# Patient Record
Sex: MALE | Race: BLACK OR AFRICAN AMERICAN | NOT HISPANIC OR LATINO | Employment: FULL TIME | URBAN - METROPOLITAN AREA
[De-identification: names, ages, dates, MRNs, and addresses within clinical notes are randomized per-mention and may not be internally consistent; named-entity substitution may affect disease eponyms.]

---

## 2023-01-07 ENCOUNTER — HOSPITAL ENCOUNTER (EMERGENCY)
Facility: HOSPITAL | Age: 23
Discharge: HOME | End: 2023-01-07
Attending: EMERGENCY MEDICINE | Admitting: EMERGENCY MEDICINE
Payer: COMMERCIAL

## 2023-01-07 VITALS
SYSTOLIC BLOOD PRESSURE: 133 MMHG | HEIGHT: 68 IN | BODY MASS INDEX: 27.43 KG/M2 | WEIGHT: 181 LBS | RESPIRATION RATE: 18 BRPM | OXYGEN SATURATION: 97 % | HEART RATE: 90 BPM | DIASTOLIC BLOOD PRESSURE: 111 MMHG

## 2023-01-07 DIAGNOSIS — R44.0 AUDITORY HALLUCINATION: Primary | ICD-10-CM

## 2023-01-07 LAB
ALBUMIN SERPL-MCNC: 4.7 G/DL (ref 3.5–5.2)
ALBUMIN/GLOB SERPL: 1.6 G/DL
ALP SERPL-CCNC: 107 U/L (ref 39–117)
ALT SERPL W P-5'-P-CCNC: 28 U/L (ref 1–41)
AMPHET+METHAMPHET UR QL: NEGATIVE
ANION GAP SERPL CALCULATED.3IONS-SCNC: 12.1 MMOL/L (ref 5–15)
APAP SERPL-MCNC: <5 MCG/ML (ref 0–30)
AST SERPL-CCNC: 24 U/L (ref 1–40)
BARBITURATES UR QL SCN: NEGATIVE
BASOPHILS # BLD AUTO: 0.03 10*3/MM3 (ref 0–0.2)
BASOPHILS NFR BLD AUTO: 0.3 % (ref 0–1.5)
BENZODIAZ UR QL SCN: NEGATIVE
BILIRUB SERPL-MCNC: 1 MG/DL (ref 0–1.2)
BUN SERPL-MCNC: 13 MG/DL (ref 6–20)
BUN/CREAT SERPL: 14 (ref 7–25)
CALCIUM SPEC-SCNC: 9.7 MG/DL (ref 8.6–10.5)
CANNABINOIDS SERPL QL: NEGATIVE
CHLORIDE SERPL-SCNC: 102 MMOL/L (ref 98–107)
CO2 SERPL-SCNC: 25.9 MMOL/L (ref 22–29)
COCAINE UR QL: NEGATIVE
CREAT SERPL-MCNC: 0.93 MG/DL (ref 0.76–1.27)
DEPRECATED RDW RBC AUTO: 35.4 FL (ref 37–54)
EGFRCR SERPLBLD CKD-EPI 2021: 118.3 ML/MIN/1.73
EOSINOPHIL # BLD AUTO: 0.06 10*3/MM3 (ref 0–0.4)
EOSINOPHIL NFR BLD AUTO: 0.6 % (ref 0.3–6.2)
ERYTHROCYTE [DISTWIDTH] IN BLOOD BY AUTOMATED COUNT: 12.8 % (ref 12.3–15.4)
ETHANOL BLD-MCNC: <10 MG/DL (ref 0–10)
ETHANOL UR QL: <0.01 %
GLOBULIN UR ELPH-MCNC: 3 GM/DL
GLUCOSE SERPL-MCNC: 100 MG/DL (ref 65–99)
HCT VFR BLD AUTO: 39.6 % (ref 37.5–51)
HGB BLD-MCNC: 13.7 G/DL (ref 13–17.7)
HOLD SPECIMEN: NORMAL
HOLD SPECIMEN: NORMAL
IMM GRANULOCYTES # BLD AUTO: 0.03 10*3/MM3 (ref 0–0.05)
IMM GRANULOCYTES NFR BLD AUTO: 0.3 % (ref 0–0.5)
LYMPHOCYTES # BLD AUTO: 2.53 10*3/MM3 (ref 0.7–3.1)
LYMPHOCYTES NFR BLD AUTO: 23.9 % (ref 19.6–45.3)
MCH RBC QN AUTO: 26.8 PG (ref 26.6–33)
MCHC RBC AUTO-ENTMCNC: 34.6 G/DL (ref 31.5–35.7)
MCV RBC AUTO: 77.3 FL (ref 79–97)
METHADONE UR QL SCN: NEGATIVE
MONOCYTES # BLD AUTO: 0.8 10*3/MM3 (ref 0.1–0.9)
MONOCYTES NFR BLD AUTO: 7.6 % (ref 5–12)
NEUTROPHILS NFR BLD AUTO: 67.3 % (ref 42.7–76)
NEUTROPHILS NFR BLD AUTO: 7.13 10*3/MM3 (ref 1.7–7)
NRBC BLD AUTO-RTO: 0 /100 WBC (ref 0–0.2)
OPIATES UR QL: NEGATIVE
OXYCODONE UR QL SCN: NEGATIVE
PLATELET # BLD AUTO: 254 10*3/MM3 (ref 140–450)
PMV BLD AUTO: 10.5 FL (ref 6–12)
POTASSIUM SERPL-SCNC: 3.7 MMOL/L (ref 3.5–5.2)
PROT SERPL-MCNC: 7.7 G/DL (ref 6–8.5)
RBC # BLD AUTO: 5.12 10*6/MM3 (ref 4.14–5.8)
SALICYLATES SERPL-MCNC: <0.3 MG/DL
SODIUM SERPL-SCNC: 140 MMOL/L (ref 136–145)
WBC NRBC COR # BLD: 10.58 10*3/MM3 (ref 3.4–10.8)
WHOLE BLOOD HOLD COAG: NORMAL
WHOLE BLOOD HOLD SPECIMEN: NORMAL

## 2023-01-07 PROCEDURE — 80307 DRUG TEST PRSMV CHEM ANLYZR: CPT

## 2023-01-07 PROCEDURE — 80179 DRUG ASSAY SALICYLATE: CPT

## 2023-01-07 PROCEDURE — 85025 COMPLETE CBC W/AUTO DIFF WBC: CPT

## 2023-01-07 PROCEDURE — 36415 COLL VENOUS BLD VENIPUNCTURE: CPT

## 2023-01-07 PROCEDURE — 80053 COMPREHEN METABOLIC PANEL: CPT

## 2023-01-07 PROCEDURE — 99283 EMERGENCY DEPT VISIT LOW MDM: CPT

## 2023-01-07 PROCEDURE — 80143 DRUG ASSAY ACETAMINOPHEN: CPT

## 2023-01-07 PROCEDURE — 82077 ASSAY SPEC XCP UR&BREATH IA: CPT

## 2023-01-07 RX ORDER — SODIUM CHLORIDE 0.9 % (FLUSH) 0.9 %
10 SYRINGE (ML) INJECTION AS NEEDED
Status: DISCONTINUED | OUTPATIENT
Start: 2023-01-07 | End: 2023-01-07 | Stop reason: HOSPADM

## 2023-01-07 NOTE — Clinical Note
[facility] EMERGENCY ROOM  [address]  [place] [address]  Phone: [phone]    Ivan Bradshaw was seen and treated in our emergency department on 1/7/2023.  He may return to work on 01/09/2023.         Thank you for choosing [facility].    Jade Guardado, APRN

## 2023-01-07 NOTE — SIGNIFICANT NOTE
01/07/23 1722   Behavior WDL   Behavior WDL WDL;interactions;motor movement   Interactions eye contact appropriate;cooperative   Motor Movement no unusual gestures/mannerisms   Emotion Mood WDL   Emotion/Mood/Affect WDL X;affect;emotion mood   Affect flat   Emotion/Mood calm   Speech WDL   Speech WDL WDL   Perceptual State WDL   Perceptual State WDL WDL;hallucinations;perceptual state   Hallucinations denies hallucinations;other (see comments)  (reports hallucinations when angry, however, did not expand other than stating he hears voices to do things)   Perceptual State consistent with reality   Thought Process WDL   Thought Process WDL WDL   Intellectual Performance WDL   Intellectual Performance WDL WDL;intellectual performance;level of consciousness   Intellectual Performance able to comprehend   Level of Consciousness Alert   Coping/Stress   Major Change/Loss/Stressor residence change   Patient Personal Strengths self-reliant;strong support system   Sources of Support significant other   Techniques to Man with Loss/Stress/Change none   Reaction to Health Status adjusting   C-SSRS (Recent)   Q1 Wished to be Dead (Past Month) no   Q2 Suicidal Thoughts (Past Month) no   Q6 Suicide Behavior (Lifetime) no   Violence Risk   Feels Like Hurting Others no   Previous Attempt to Harm Others no       SW met with pt at bedside at the request of the provider. Pt reports that he is here for medication management. Pt denied any current medications and denied any prior psychiatry or outpatient therapy. Pt reports that he has hallucinations sometimes when he is angry and these voices tell him to do things he would not normally do, however, pt was guarded and would not expand on what those things may be throughout the assessment. Pt denied current SI/HI this date. Pt reports that he resides with his girlfriend and that is his support system. Pt and SW discussed outpatient referral this date, pt was agreeable. SW made referral  to [facility] for [facility]ry. SW also provided pt with community resource packet. SW updated provider.

## 2023-01-07 NOTE — ED PROVIDER NOTES
Time: 3:23 PM EST  Date of encounter:  1/7/2023  Independent Historian/Clinical History and Information was obtained by:   Patient  Chief Complaint   Patient presents with   • Psychiatric Evaluation       History is limited by: N/A    History of Present Illness:  Patient is a 22 y.o. year old male who presents to the emergency department for evaluation of auditory hallucinations.  He says he has bipolar disorder that is not treated with any medications and he has been trying to get in with someone but he cannot.  He says he has people in his head telling him stuff he does not normally feel.  He is not suicidal or homicidal.  He also reports mood swings and anger that he cannot control.    HPI    Patient Care Team  Primary Care Provider: Provider, No Known    Past Medical History:     No Known Allergies  Past Medical History:   Diagnosis Date   • Autism      History reviewed. No pertinent surgical history.  History reviewed. No pertinent family history.    Home Medications:  Prior to Admission medications    Medication Sig Start Date End Date Taking? Authorizing Provider   ibuprofen (ADVIL,MOTRIN) 800 MG tablet Take 1 tablet by mouth Every 8 (Eight) Hours As Needed for Mild Pain  or Moderate Pain . 3/22/22   Enedina Arenas APRN        Social History:   Social History     Tobacco Use   • Smoking status: Never   • Smokeless tobacco: Never   Substance Use Topics   • Alcohol use: Never   • Drug use: Never         Review of Systems:  Review of Systems   Constitutional: Negative for activity change, appetite change and fever.   HENT: Negative.    Eyes: Negative.    Respiratory: Negative for shortness of breath.    Cardiovascular: Negative.    Gastrointestinal: Negative for nausea and vomiting.   Endocrine: Negative.    Genitourinary: Negative.    Musculoskeletal: Negative.    Skin: Negative.    Allergic/Immunologic: Negative.    Neurological: Negative for dizziness and headaches.   Psychiatric/Behavioral: Positive  "for hallucinations. Negative for agitation, confusion, self-injury and suicidal ideas. The patient is not nervous/anxious.         Physical Exam:  BP (!) 133/111   Pulse 90   Resp 18   Ht 172.7 cm (68\")   Wt 82.1 kg (181 lb)   SpO2 97%   BMI 27.52 kg/m²     Physical Exam  Vitals and nursing note reviewed.   Constitutional:       General: He is not in acute distress.     Appearance: Normal appearance. He is not ill-appearing.   HENT:      Head: Normocephalic and atraumatic.      Nose: Nose normal.   Eyes:      Extraocular Movements: Extraocular movements intact.      Pupils: Pupils are equal, round, and reactive to light.   Cardiovascular:      Rate and Rhythm: Normal rate and regular rhythm.      Pulses: Normal pulses.      Heart sounds: Normal heart sounds. No murmur heard.    No gallop.   Pulmonary:      Effort: Pulmonary effort is normal. No respiratory distress.      Breath sounds: Normal breath sounds. No wheezing, rhonchi or rales.   Chest:      Chest wall: No tenderness.   Abdominal:      General: Bowel sounds are normal. There is no distension.      Palpations: Abdomen is soft.      Tenderness: There is no abdominal tenderness.   Musculoskeletal:         General: No tenderness. Normal range of motion.      Cervical back: Normal range of motion and neck supple.   Skin:     General: Skin is warm and dry.      Findings: No erythema or rash.   Neurological:      General: No focal deficit present.      Mental Status: He is alert and oriented to person, place, and time.      Motor: No weakness.   Psychiatric:         Attention and Perception: Attention normal.         Mood and Affect: Mood and affect normal.         Speech: Speech normal.         Behavior: Behavior normal. Behavior is not agitated or aggressive. Behavior is cooperative.         Thought Content: Thought content normal. Thought content is not delusional. Thought content does not include homicidal or suicidal ideation. Thought content does not " include homicidal or suicidal plan.         Judgment: Judgment normal.                  Procedures:  Procedures      Medical Decision Making:      Comorbidities that affect care:    None    External Notes reviewed:    Previous Clinic Note      The following orders were placed and all results were independently analyzed by me:  Orders Placed This Encounter   Procedures   • [place] Draw   • Comprehensive Metabolic Panel   • Acetaminophen Level   • Ethanol   • Salicylate Level   • Urine Drug Screen - Urine, Clean Catch   • CBC Auto Differential   • NPO Diet NPO Type: Strict NPO   • Cardiac Monitoring   • Continuous Pulse Oximetry   • Vital Signs   • Undress & Gown   • Psych / Access to See   • POC Glucose Once   • Insert Peripheral IV   • Suicide Precautions   • CBC & Differential   • Green Top (Gel)   • Lavender Top   • Gold Top - SST   • Light Blue Top       Medications Given in the Emergency Department:  Medications   sodium chloride 0.9 % flush 10 mL (has no administration in time range)        ED Course:    The patient was initially evaluated in the triage area where orders were placed. The patient was later dispositioned by KAVIN King.      The patient was advised to stay for completion of workup which includes but is not limited to communication of labs and radiological results, reassessment and plan. The patient was advised that leaving prior to disposition by a provider could result in critical findings that are not communicated to the patient.          Labs:    Lab Results (last 24 hours)     Procedure Component Value Units Date/Time    CBC & Differential [[id number]]  (Abnormal) Collected: 01/07/23 1526    Specimen: Blood Updated: 01/07/23 1536    Narrative:      The following orders were created for panel order CBC & Differential.  Procedure                               Abnormality         Status                     ---------                               -----------         ------                      CBC Auto Differential[[id number]]        Abnormal            Final result                 Please view results for these tests on the individual orders.    Comprehensive Metabolic Panel [[id number]]  (Abnormal) Collected: 01/07/23 1526    Specimen: Blood Updated: 01/07/23 1605     Glucose 100 mg/dL      BUN 13 mg/dL      Creatinine 0.93 mg/dL      Sodium 140 mmol/L      Potassium 3.7 mmol/L      Chloride 102 mmol/L      CO2 25.9 mmol/L      Calcium 9.7 mg/dL      Total Protein 7.7 g/dL      Albumin 4.7 g/dL      ALT (SGPT) 28 U/L      AST (SGOT) 24 U/L      Alkaline Phosphatase 107 U/L      Total Bilirubin 1.0 mg/dL      Globulin 3.0 gm/dL      A/G Ratio 1.6 g/dL      BUN/Creatinine Ratio 14.0     Anion Gap 12.1 mmol/L      eGFR 118.3 mL/min/1.73      Comment: National Kidney Foundation and American Society of Nephrology (ASN) Task Force recommended calculation based on the Chronic Kidney Disease Epidemiology Collaboration (CKD-EPI) equation refit without adjustment for race.       Narrative:      GFR Normal >60  Chronic Kidney Disease <60  Kidney Failure <15      Acetaminophen Level [[id number]]  (Normal) Collected: 01/07/23 1526    Specimen: Blood Updated: 01/07/23 1605     Acetaminophen <5.0 mcg/mL     Ethanol [[id number]] Collected: 01/07/23 1526    Specimen: Blood Updated: 01/07/23 1605     Ethanol <10 mg/dL      Ethanol % <0.010 %     Narrative:      Ethanol (Plasma)  <10 Essentially Negative    Toxic Concentrations           mg/dL    Flushing, slowing of reflexes    Impaired visual activity         Depression of CNS              >100  Possible Coma                  >300       Salicylate Level [[id number]]  (Normal) Collected: 01/07/23 1526    Specimen: Blood Updated: 01/07/23 1605     Salicylate <0.3 mg/dL     CBC Auto Differential [[id number]]  (Abnormal) Collected: 01/07/23 1526    Specimen: Blood Updated: 01/07/23 1536     WBC 10.58 10*3/mm3      RBC 5.12 10*6/mm3      Hemoglobin 13.7 g/dL       Hematocrit 39.6 %      MCV 77.3 fL      MCH 26.8 pg      MCHC 34.6 g/dL      RDW 12.8 %      RDW-SD 35.4 fl      MPV 10.5 fL      Platelets 254 10*3/mm3      Neutrophil % 67.3 %      Lymphocyte % 23.9 %      Monocyte % 7.6 %      Eosinophil % 0.6 %      Basophil % 0.3 %      Immature Grans % 0.3 %      Neutrophils, Absolute 7.13 10*3/mm3      Lymphocytes, Absolute 2.53 10*3/mm3      Monocytes, Absolute 0.80 10*3/mm3      Eosinophils, Absolute 0.06 10*3/mm3      Basophils, Absolute 0.03 10*3/mm3      Immature Grans, Absolute 0.03 10*3/mm3      nRBC 0.0 /100 WBC     Urine Drug Screen - Urine, Clean Catch [[id number]]  (Normal) Collected: 01/07/23 1532    Specimen: Urine, Clean Catch Updated: 01/07/23 1619     Amphet/Methamphet, Screen Negative     Barbiturates Screen, Urine Negative     Benzodiazepine Screen, Urine Negative     Cocaine Screen, Urine Negative     Opiate Screen Negative     THC, Screen, Urine Negative     Methadone Screen, Urine Negative     Oxycodone Screen, Urine Negative    Narrative:      Negative Thresholds Per Drugs Screened:    Amphetamines                 500 ng/ml  Barbiturates                 200 ng/ml  Benzodiazepines              100 ng/ml  Cocaine                      300 ng/ml  Methadone                    300 ng/ml  Opiates                      300 ng/ml  Oxycodone                    100 ng/ml  THC                           50 ng/ml    The Normal Value for all drugs tested is negative. This report includes final unconfirmed screening results to be used for medical treatment purposes only. Unconfirmed results must not be used for non-medical purposes such as employment or legal testing. Clinical consideration should be applied to any drug of abuse test, particularly when unconfirmed results are used.                   Imaging:    No Radiology Exams Resulted Within Past 24 Hours      Differential Diagnosis and Discussion:      Differential diagnosis includes but not limited to suicidal  ideation, homicidal ideation, auditory hallucination, bipolar.    All labs were reviewed and analyzed by me.    MDM  Number of Diagnoses or Management Options  Diagnosis management comments: Patient presents today with complaints of auditory hallucinations intermittently when he has episodes of anger at home.  Patient reports he is feeling safe at home.  Denies any visual hallucinations.  He tells me that he hears voices only when he gets angry to tell him to do bad things to other people into himself.  He denies any active episodes of acting out on the hallucinations he reports.  No suicidal or homicidal ideations at this time.  He is alert and oriented x3.  He is cooperative.  He is able to make his own decisions at this time.  Consulted with social work here in the emergency department who has patient scheduled as outpatient to follow-up with behavioral health.  The patient was provided with resources upon discharge.  He is agreeable to this plan of care.       Amount and/or Complexity of Data Reviewed  Clinical lab tests: reviewed and ordered  Review and summarize past medical records: yes    Risk of Complications, Morbidity, and/or Mortality  Presenting problems: high  Diagnostic procedures: moderate  Management options: moderate    Patient Progress  Patient progress: stable           Patient Care Considerations:    None      Consultants/Shared Management Plan:    Consultant: I have discussed the case with Emergency department  who states Patient can be discharged home safely to follow-up as outpatient with psychiatry.  Resources provided    Social Determinants of Health:     was consulted and Patient was provided with resources for outpatient follow-up with psychiatry and behavioral health      Disposition and Care Coordination:    Discharged: The patient is suitable and stable for discharge with no need for consideration of observation or admission.    I have explained discharge  medications and the need for follow up with the patient/caretakers. This was also printed in the discharge instructions. Patient was discharged with the following medications and follow up:      Medication List      No changes were made to your prescriptions during this visit.      Provider, No Known  [facility] [id number]    Schedule an appointment as soon as possible for a visit       Behavioral health  Please keep your appointment with behavioral health as we provided you with the resources for follow-up.        [facility] EMERGENCY ROOM  [address] [address] [phone]  Go to   As needed, If symptoms worsen       Final diagnoses:   Auditory hallucination        ED Disposition     ED Disposition   Discharge    Condition   Stable    Comment   --             This medical record created using voice recognition software.           Jade Guardado, APRN  01/07/23 3830

## 2023-01-07 NOTE — DISCHARGE INSTRUCTIONS
All of your labs today were normal.  Please make sure that you follow-up with behavioral health from the resources that we provided you.  Return to the emergency department with any new or worsening symptoms such as any suicidal or homicidal thoughts or behaviors.

## 2023-01-24 ENCOUNTER — OFFICE VISIT (OUTPATIENT)
Dept: PSYCHIATRY | Facility: CLINIC | Age: 23
End: 2023-01-24
Payer: COMMERCIAL

## 2023-01-24 VITALS
HEART RATE: 97 BPM | HEIGHT: 68 IN | BODY MASS INDEX: 27.34 KG/M2 | WEIGHT: 180.4 LBS | SYSTOLIC BLOOD PRESSURE: 170 MMHG | DIASTOLIC BLOOD PRESSURE: 85 MMHG

## 2023-01-24 DIAGNOSIS — F51.05 INSOMNIA DUE TO MENTAL DISORDER: ICD-10-CM

## 2023-01-24 DIAGNOSIS — F31.5 BIPOLAR DISORDER, CURRENT EPISODE DEPRESSED, SEVERE, WITH PSYCHOTIC FEATURES: Primary | ICD-10-CM

## 2023-01-24 DIAGNOSIS — F41.1 GENERALIZED ANXIETY DISORDER: ICD-10-CM

## 2023-01-24 PROCEDURE — 90792 PSYCH DIAG EVAL W/MED SRVCS: CPT | Performed by: NURSE PRACTITIONER

## 2023-01-24 RX ORDER — ARIPIPRAZOLE 5 MG/1
5 TABLET ORAL DAILY
Qty: 30 TABLET | Refills: 0 | Status: SHIPPED | OUTPATIENT
Start: 2023-01-24 | End: 2023-02-16 | Stop reason: SDUPTHER

## 2023-01-24 NOTE — TREATMENT PLAN
Multi-Disciplinary Problems (from Behavioral Health Treatment Plan)    Active Problems     Problem: Anxiety  Start Date: 01/24/23    Problem Details: The patient self-scales this problem as a 5 with 10 being the worst.        Goal Priority Start Date Expected End Date End Date    Patient will develop and implement behavioral and cognitive strategies to reduce anxiety and irrational fears. -- 01/24/23 -- --    Goal Details: Progress toward goal:  Not appropriate to rate progress toward goal since this is the initial treatment plan.        Goal Intervention Frequency Start Date End Date    Help patient explore past emotional issues in relation to present anxiety. Weekly 01/24/23 --    Intervention Details: Duration of treatment until until remission of symptoms.        Goal Intervention Frequency Start Date End Date    Help patient develop an awareness of their cognitive and physical responses to anxiety. Weekly 01/24/23 --    Intervention Details: Duration of treatment until until remission of symptoms.              Problem: Mood Disorder  Start Date: 01/24/23    Problem Details: The patient self-scales this problem as a 5 with 10 being the worst.      Goal Priority Start Date Expected End Date End Date    Decrease impulsivity in action- that is, not engaging in self-destructive behavious such as overspending, promiscuity, substance abuse, or use of profane language. -- 01/24/23 -- --    Goal Details: Progress toward goal:  Not appropriate to rate progress toward goal since this is the initial treatment plan.      Goal Intervention Frequency Start Date End Date    Provide structure and focus to patients thoughts and action by establishing plans and routine. Weekly 01/24/23 --    Intervention Details: Duration of treatment until until remission of symptoms.      Goal Intervention Frequency Start Date End Date    Assist patient in setting reasonable goals and limits on behavior. Weekly 01/24/23 --    Intervention  Details: Duration of treatment until until remission of symptoms.                  Reviewed By     Juan Carlos Patel, KAVIN 01/24/23 1528                 I have discussed and reviewed this treatment plan with the patient.

## 2023-01-24 NOTE — PROGRESS NOTES
Subjective   Ivan Bradshaw is a 22 y.o. male who presents today for initial evaluation   This provider is located at [address] [place] [address], [address] in [place], [place]. In-person visit consisting of the patient and I only. The patient is accepting of and agreeable to this appointment.       Chief Complaint:  Depression, Anxiety, Psychosis    History of Present Illness: Depression: onset approximately 5 years ago, no specific triggers  Depressed mood: y  Markedly diminished interest or pleasure: y  Significant weight loss when not dieting or weight gain, or decrease or increase in appetite: n  Insomnia or hypersomnia: y- insomnia  Psychomotor agitation or retardation: n  Fatigue or loss of energy: y  Feelings of worthlessness or excessive or inappropriate guilt: y  Diminished ability to think or concentrate, or indecisiveness: y  Recurrent thoughts of death, recurrent suicidal ideation without a specific plan, or suicide attempt or specific plan for committing suicide- denies    Anxiety: Onset 5 years ago (see above)  Anxious, nervous or worried on most days about a number of events or activities- y  No control over worries- y- working on positive coping skills  Irritability- y  Fatigue: see above  Difficulty concentrating- see above  Sleep disturbance- see above  Restlessness- y  Tension in muscles-y    Shellie/hypomania: Onset 5 years ago  Grandiosity- denies  Decreased need for sleep- denies  More talkative than usual or pressure to keep talking- denies  Flight of ideas or subjective experience that thoughts are racing- denies  Distractibility- y  Increase in goal directed activity or psychomotor agitation- denies  Excessive involvement in activities that have a high potential for painful consequences- denies    Psychosis: began hearing voices two weeks ago  Delusions- denies  Hallucinations- endorses auditory hallucinations, stating he hears negative voices  Disorganized speech- n  Grossly disorganized or  catatonic behavior- denies  Negative symptoms- denies    Psychiatric Review of Systems: Patient denies any current or previous PTSD.     PHQ-9 Depression Screening  PHQ-9 Total Score:      Little interest or pleasure in doing things?     Feeling down, depressed, or hopeless?     Trouble falling or staying asleep, or sleeping too much?     Feeling tired or having little energy?     Poor appetite or overeating?     Feeling bad about yourself - or that you are a failure or have let yourself or your family down?     Trouble concentrating on things, such as reading the newspaper or watching television?     Moving or speaking so slowly that other people could have noticed? Or the opposite - being so fidgety or restless that you have been moving around a lot more than usual?     Thoughts that you would be better off dead, or of hurting yourself in some way?     PHQ-9 Total Score       ATUL-7  Feeling nervous, anxious or on edge: Several days  Not being able to stop or control worrying: Not at all  Worrying too much about different things: Not at all  Trouble Relaxing: Several days  Being so restless that it is hard to sit still: Not at all  Feeling afraid as if something awful might happen: Not at all  Becoming easily annoyed or irritable: Not at all  ATUL 7 Total Score: 2  If you checked any problems, how difficult have these problems made it for you to do your work, take care of things at home, or get along with other people: Not difficult at all      Past Surgical History:  History reviewed. No pertinent surgical history.    Problem List:  There is no problem list on file for this patient.      Allergy:   No Known Allergies     Discontinued Medications:  Medications Discontinued During This Encounter   Medication Reason   • ibuprofen (ADVIL,MOTRIN) 800 MG tablet Historical Med - Therapy completed       Current Medications:   Current Outpatient Medications   Medication Sig Dispense Refill   • ARIPiprazole (ABILIFY) 5 MG  "tablet Take 1 tablet by mouth Daily for 30 days. 30 tablet 0     No current facility-administered medications for this visit.       Past Medical History:  Past Medical History:   Diagnosis Date   • Autism        Past Psychiatric History:  Began Treatment: 2017  Diagnoses: Bipolar Disorder, anxiety  Psychiatrist: Nithin  Therapist: Denies  Admission History: Denies  Medication Trials: Denies  Self Harm: Denies  Suicide Attempts: Denies    Substance Abuse History:   Types: Denies  Withdrawal Symptoms: Not applicable  Longest Period Sober: Not applicable  AA: Not applicable    Social History:  Martial Status: Single  Employed: [employer]  Kids: One   House: Self   History: Denies    Social History     Socioeconomic History   • Marital status: Single   Tobacco Use   • Smoking status: Never   • Smokeless tobacco: Never   Substance and Sexual Activity   • Alcohol use: Never   • Drug use: Never   • Sexual activity: Defer       Family History:   Suicide Attempts: Denies  Suicide Completions: Denies      History reviewed. No pertinent family history.    Developmental History:   Born: [place]  Siblings: Multiple  Childhood: Denies  High School: Dropped out 12th grade  College: Denies    Access to Firearms: Denies    Mental Status Exam:     Appearance: good eye contact, normal street clothes, groomed, sitting in chair   Behavior: pleasant and cooperative  Motor: no abnormal  Speech: normal rhythm, rate, volume, tone, not hyperverbal, not pressured, normal prosidy  Mood: \" Okay \"  Affect: depressed  Thought Content: negative suicidal ideations, negative homicidal ideations, negative obsessions  Perceptions: negative auditory hallucinations, negative visual hallucinations, negative delusions, negative paranoia  Thought Process: goal directed, linear  Insight/Judgement: fair/fair  Cognition: grossly intact  Attention: intact  Orientation: person, place, time and situation  Memory: intact    Review of Systems:     Constitutional: " "Denies fatigue, night sweats  Eyes: Denies double vision, blurred vision  HENT: Denies vertigo, recent head injury  Cardiovascular: Denies chest pain, irregular heartbeats  Respiratory: Denies productive cough, shortness of breath  Gastrointestinal: Denies nausea, vomiting  Genitourinary: Denies dysuria, urinary retention  Integument: Denies hair growth change, new skin lesions  Neurologic: Denies altered mental status, seizures  Musculoskeletal: Denies joint swelling, limitation of motion  Endocrine: Denies cold intolerance, heat intolerance  Psychiatric: Admits anxiety, depression, psychosis. Denies soo, post-traumatic stress disorder, obsessive compulsive disorder  Allergic-immunologic: Denies frequent illnesses      Vital Signs:   /85   Pulse 97   Ht 172.7 cm (68\")   Wt 81.8 kg (180 lb 6.4 oz)   BMI 27.43 kg/m²      Lab Results:   Admission on 01/07/2023, Discharged on 01/07/2023   Component Date Value Ref Range Status   • Glucose 01/07/2023 100 (H)  65 - 99 mg/dL Final   • BUN 01/07/2023 13  6 - 20 mg/dL Final   • Creatinine 01/07/2023 0.93  0.76 - 1.27 mg/dL Final   • Sodium 01/07/2023 140  136 - 145 mmol/L Final   • Potassium 01/07/2023 3.7  3.5 - 5.2 mmol/L Final   • Chloride 01/07/2023 102  98 - 107 mmol/L Final   • CO2 01/07/2023 25.9  22.0 - 29.0 mmol/L Final   • Calcium 01/07/2023 9.7  8.6 - 10.5 mg/dL Final   • Total Protein 01/07/2023 7.7  6.0 - 8.5 g/dL Final   • Albumin 01/07/2023 4.7  3.5 - 5.2 g/dL Final   • ALT (SGPT) 01/07/2023 28  1 - 41 U/L Final   • AST (SGOT) 01/07/2023 24  1 - 40 U/L Final   • Alkaline Phosphatase 01/07/2023 107  39 - 117 U/L Final   • Total Bilirubin 01/07/2023 1.0  0.0 - 1.2 mg/dL Final   • Globulin 01/07/2023 3.0  gm/dL Final   • A/G Ratio 01/07/2023 1.6  g/dL Final   • BUN/Creatinine Ratio 01/07/2023 14.0  7.0 - 25.0 Final   • Anion Gap 01/07/2023 12.1  5.0 - 15.0 mmol/L Final   • eGFR 01/07/2023 118.3  >60.0 mL/min/1.73 Final    National Kidney Foundation " and American Society of Nephrology (ASN) Task Force recommended calculation based on the Chronic Kidney Disease Epidemiology Collaboration (CKD-EPI) equation refit without adjustment for race.   • Acetaminophen 01/07/2023 <5.0  0.0 - 30.0 mcg/mL Final   • Ethanol 01/07/2023 <10  0 - 10 mg/dL Final   • Ethanol % 01/07/2023 <0.010  % Final   • Salicylate 01/07/2023 <0.3  <=30.0 mg/dL Final   • Amphet/Methamphet, Screen 01/07/2023 Negative  Negative Final   • Barbiturates Screen, Urine 01/07/2023 Negative  Negative Final   • Benzodiazepine Screen, Urine 01/07/2023 Negative  Negative Final   • Cocaine Screen, Urine 01/07/2023 Negative  Negative Final   • Opiate Screen 01/07/2023 Negative  Negative Final   • THC, Screen, Urine 01/07/2023 Negative  Negative Final   • Methadone Screen, Urine 01/07/2023 Negative  Negative Final   • Oxycodone Screen, Urine 01/07/2023 Negative  Negative Final   • Extra Tube 01/07/2023 Hold for add-ons.   Final    Auto resulted.   • Extra Tube 01/07/2023 hold for add-on   Final    Auto resulted   • Extra Tube 01/07/2023 Hold for add-ons.   Final    Auto resulted.   • Extra Tube 01/07/2023 Hold for add-ons.   Final    Auto resulted   • WBC 01/07/2023 10.58  3.40 - 10.80 10*3/mm3 Final   • RBC 01/07/2023 5.12  4.14 - 5.80 10*6/mm3 Final   • Hemoglobin 01/07/2023 13.7  13.0 - 17.7 g/dL Final   • Hematocrit 01/07/2023 39.6  37.5 - 51.0 % Final   • MCV 01/07/2023 77.3 (L)  79.0 - 97.0 fL Final   • MCH 01/07/2023 26.8  26.6 - 33.0 pg Final   • MCHC 01/07/2023 34.6  31.5 - 35.7 g/dL Final   • RDW 01/07/2023 12.8  12.3 - 15.4 % Final   • RDW-SD 01/07/2023 35.4 (L)  37.0 - 54.0 fl Final   • MPV 01/07/2023 10.5  6.0 - 12.0 fL Final   • Platelets 01/07/2023 254  140 - 450 10*3/mm3 Final   • Neutrophil % 01/07/2023 67.3  42.7 - 76.0 % Final   • Lymphocyte % 01/07/2023 23.9  19.6 - 45.3 % Final   • Monocyte % 01/07/2023 7.6  5.0 - 12.0 % Final   • Eosinophil % 01/07/2023 0.6  0.3 - 6.2 % Final   • Basophil  % 01/07/2023 0.3  0.0 - 1.5 % Final   • Immature Grans % 01/07/2023 0.3  0.0 - 0.5 % Final   • Neutrophils, Absolute 01/07/2023 7.13 (H)  1.70 - 7.00 10*3/mm3 Final   • Lymphocytes, Absolute 01/07/2023 2.53  0.70 - 3.10 10*3/mm3 Final   • Monocytes, Absolute 01/07/2023 0.80  0.10 - 0.90 10*3/mm3 Final   • Eosinophils, Absolute 01/07/2023 0.06  0.00 - 0.40 10*3/mm3 Final   • Basophils, Absolute 01/07/2023 0.03  0.00 - 0.20 10*3/mm3 Final   • Immature Grans, Absolute 01/07/2023 0.03  0.00 - 0.05 10*3/mm3 Final   • nRBC 01/07/2023 0.0  0.0 - 0.2 /100 WBC Final   Admission on 02/14/2022, Discharged on 02/14/2022   Component Date Value Ref Range Status   • SARS Antigen 02/14/2022 Not Detected  Not Detected Final   • Internal Control 02/14/2022 Passed  Passed Final   • Lot Number 02/14/2022 [id number]   Final   • Expiration Date 02/14/2022 [id number]   Final       EKG Results:  No orders to display       Imaging Results:  No Images in the past 120 days found..      Assessment & Plan   Diagnoses and all orders for this visit:    1. Bipolar disorder, current episode depressed, severe, with psychotic features (HCC) (Primary)  -     ARIPiprazole (ABILIFY) 5 MG tablet; Take 1 tablet by mouth Daily for 30 days.  Dispense: 30 tablet; Refill: 0    2. Generalized anxiety disorder    3. Insomnia due to mental disorder      Presents with history of bipolar disorder and anxiety. Start aripiprazole to target mood and psychosis. 16 minutes of supportive psychotherapy with goal to strengthen defenses, promote problems solving, restore adaptive functioning and provide symptom relief. The therapeutic alliance was strengthened to encourage the patient to express their thoughts and feelings. Esteem building was enhanced through praise, reassurance, normalizing and encouragement. Coping skills were enhanced to build distress tolerance skills and emotional regulation. Allowed patient to freely discuss issues without interruption or judgement with  unconditional positive regard, active listening skills, and empathy. Provided a safe, confidential environment to facilitate the development of a positive therapeutic relationship and encourage open, honest communication. Assisted patient in identifying risk factors which would indicate the need for higher level of care including thoughts to harm self or others and/or self-harming behavior and encouraged patient to contact this office, call 911, or present to the nearest emergency room should any of these events occur. Assisted patient in processing session content; acknowledged and normalized patient's thoughts, feelings, and concerns by utilizing a person-centered approach in efforts to build appropriate rapport and a positive therapeutic relationship with open and honest communication. Patient given education on medication side effects, diagnosis/illness and relapse symptoms. Plan to continue supportive psychotherapy in next appointment to provide symptom relief. 2 weeks    Diagnoses: as above  Symptoms: as above  Functional status: good  Mental Status Exam: as above     Treatment plan: medication management and supportive psychotherapy  Prognosis: good  Progress: initial visit    Visit Diagnoses:    ICD-10-CM ICD-9-CM   1. Bipolar disorder, current episode depressed, severe, with psychotic features ([facility])  F31.5 296.54   2. Generalized anxiety disorder  F41.1 300.02   3. Insomnia due to mental disorder  F51.05 300.9     327.02       PLAN:  1. Safety: No acute safety concerns.   2. Therapy: Declines  3. Risk Assessment: Risk of self-harm acutely is moderate.  Risk factors include anxiety disorder, mood disorder, and recent psychosocial stressors (pandemic). Protective factors include no family history, denies access to guns/weapons, no present SI, no history of suicide attempts or self-harm in the past, minimal AODA, healthcare seeking, future orientation, willingness to engage in care.  Risk of self-harm  chronically is also moderate, but could be further elevated in the event of treatment noncompliance and/or AODA.  4. Medications: Start aripiprazole 5mg po qday to target mood. Risks, benefits, alternatives discussed with patient including increased energy, exacerbation of irritability, akathisia, GI upset, orthostatic hypotension, increased appetite, tardive dyskinesia.  After discussion of these risks and benefits, the patient voiced understanding and agreed to proceed.  5. Labs/studies: No labs/studies ordered at this time  6. Follow-up: 2 weeks    Patient screened positive for depression based on a PHQ-9 score of  on . Follow-up recommendations include: Suicide Risk Assessment performed.         TREATMENT PLAN/GOALS: Continue supportive psychotherapy efforts and medications as indicated. Treatment and medication options discussed during today's visit. Patient ackowledged and verbally consented to continue with current treatment plan and was educated on the importance of compliance with treatment and follow-up appointments.      MEDICATION ISSUES:  ZORA reviewed as expected.  Discussed medication options and treatment plan of prescribed medication as well as the risks, benefits, and side effects including potential falls, possible impaired driving and metabolic adversities among others. Patient is agreeable to call the office with any worsening of symptoms or onset of side effects. Patient is agreeable to call 911 or go to the nearest ER should he/she begin having SI/HI. No medication side effects or related complaints today.     MEDS ORDERED DURING VISIT:  New Medications Ordered This Visit   Medications   • ARIPiprazole (ABILIFY) 5 MG tablet     Sig: Take 1 tablet by mouth Daily for 30 days.     Dispense:  30 tablet     Refill:  0       Return in about 2 weeks (around 2/7/2023) for Next scheduled follow up.         This document has been electronically signed by KAVIN Wallis  January 24, 2023 15:29  EST      Part of this note may be an electronic transcription/translation of spoken language to printed text using the Dragon Dictation System.

## 2023-02-16 ENCOUNTER — OFFICE VISIT (OUTPATIENT)
Dept: PSYCHIATRY | Facility: CLINIC | Age: 23
End: 2023-02-16
Payer: COMMERCIAL

## 2023-02-16 VITALS
BODY MASS INDEX: 27.58 KG/M2 | SYSTOLIC BLOOD PRESSURE: 153 MMHG | DIASTOLIC BLOOD PRESSURE: 80 MMHG | HEART RATE: 82 BPM | HEIGHT: 68 IN | WEIGHT: 182 LBS

## 2023-02-16 DIAGNOSIS — F31.5 BIPOLAR DISORDER, CURRENT EPISODE DEPRESSED, SEVERE, WITH PSYCHOTIC FEATURES: Primary | ICD-10-CM

## 2023-02-16 DIAGNOSIS — F41.1 GENERALIZED ANXIETY DISORDER: ICD-10-CM

## 2023-02-16 DIAGNOSIS — F51.05 INSOMNIA DUE TO MENTAL DISORDER: ICD-10-CM

## 2023-02-16 PROCEDURE — 99214 OFFICE O/P EST MOD 30 MIN: CPT | Performed by: NURSE PRACTITIONER

## 2023-02-16 PROCEDURE — 90833 PSYTX W PT W E/M 30 MIN: CPT | Performed by: NURSE PRACTITIONER

## 2023-02-16 RX ORDER — ARIPIPRAZOLE 5 MG/1
5 TABLET ORAL DAILY
Qty: 30 TABLET | Refills: 2 | Status: SHIPPED | OUTPATIENT
Start: 2023-02-16 | End: 2023-05-17

## 2023-02-16 NOTE — PROGRESS NOTES
"Subjective   Ivan Bradshaw is a 22 y.o. male who presents today for follow up.   This provider is located at [address] [place] [address], [address] in [place], [place]. In-person visit consisting of the patient and I only. The patient is accepting of and agreeable to this appointment.       Chief Complaint:  Depression, Anxiety, Psychosis    History of Present Illness: Depression over the past month  Depressed mood: has improved  Markedly diminished interest or pleasure: n  Significant weight loss when not dieting or weight gain, or decrease or increase in appetite: n  Insomnia or hypersomnia: n  Psychomotor agitation or retardation: n  Fatigue or loss of energy: n  Feelings of worthlessness or excessive or inappropriate guilt: n  Diminished ability to think or concentrate, or indecisiveness: n  Recurrent thoughts of death, recurrent suicidal ideation without a specific plan, or suicide attempt or specific plan for committing suicide- denies    Anxiety over the past month  Anxious, nervous or worried on most days about a number of events or activities- has improved- \"more calm\"  No control over worries- n- working on positive coping skills  Irritability- denies  Fatigue: see above  Difficulty concentrating- see above  Sleep disturbance- see above  Restlessness- denies  Tension in muscles- denies    Shellie/hypomania over the past month  Grandiosity- denies  Decreased need for sleep- denies  More talkative than usual or pressure to keep talking- denies  Flight of ideas or subjective experience that thoughts are racing- denies  Distractibility- denies  Increase in goal directed activity or psychomotor agitation- denies  Excessive involvement in activities that have a high potential for painful consequences- denies    Psychosis over the past month  Delusions- denies  Hallucinations- denies  Disorganized speech- n  Grossly disorganized or catatonic behavior- denies  Negative symptoms- denies    Psychiatric Review of " Systems: Patient denies any current or previous PTSD.     PHQ-9 Depression Screening  PHQ-9 Total Score:      Little interest or pleasure in doing things?     Feeling down, depressed, or hopeless?     Trouble falling or staying asleep, or sleeping too much?     Feeling tired or having little energy?     Poor appetite or overeating?     Feeling bad about yourself - or that you are a failure or have let yourself or your family down?     Trouble concentrating on things, such as reading the newspaper or watching television?     Moving or speaking so slowly that other people could have noticed? Or the opposite - being so fidgety or restless that you have been moving around a lot more than usual?     Thoughts that you would be better off dead, or of hurting yourself in some way?     PHQ-9 Total Score       ATUL-7         Past Surgical History:  History reviewed. No pertinent surgical history.    Problem List:  There is no problem list on file for this patient.      Allergy:   No Known Allergies     Discontinued Medications:  Medications Discontinued During This Encounter   Medication Reason   • ARIPiprazole (ABILIFY) 5 MG tablet Reorder       Current Medications:   Current Outpatient Medications   Medication Sig Dispense Refill   • ARIPiprazole (ABILIFY) 5 MG tablet Take 1 tablet by mouth Daily for 90 days. 30 tablet 2     No current facility-administered medications for this visit.       Past Medical History:  Past Medical History:   Diagnosis Date   • Autism        Past Psychiatric History:  Began Treatment: 2017  Diagnoses: Bipolar Disorder, anxiety  Psychiatrist: Miguel Angelies  Therapist: Denies  Admission History: Denies  Medication Trials: Denies  Self Harm: Denies  Suicide Attempts: Denies    Substance Abuse History:   Types: Denies  Withdrawal Symptoms: Not applicable  Longest Period Sober: Not applicable  AA: Not applicable    Social History:  Martial Status: Single  Employed: [employer]  Kids: One   House: Self    "History: Denies    Social History     Socioeconomic History   • Marital status: Single   Tobacco Use   • Smoking status: Never   • Smokeless tobacco: Never   Substance and Sexual Activity   • Alcohol use: Never   • Drug use: Never   • Sexual activity: Defer       Family History:   Suicide Attempts: Denies  Suicide Completions: Denies      History reviewed. No pertinent family history.    Developmental History:   Born: [place]  Siblings: Multiple  Childhood: Denies  High School: Dropped out 12th grade  College: Denies    Access to Firearms: Denies    Mental Status Exam:     Appearance: good eye contact, normal street clothes, groomed, sitting in chair   Behavior: pleasant and cooperative  Motor: no abnormal  Speech: normal rhythm, rate, volume, tone, not hyperverbal, not pressured, normal prosidy  Mood: \"Okay\"  Affect: euthymic  Thought Content: negative suicidal ideations, negative homicidal ideations, negative obsessions  Perceptions: negative auditory hallucinations, negative visual hallucinations, negative delusions, negative paranoia  Thought Process: goal directed, linear  Insight/Judgement: fair/fair  Cognition: grossly intact  Attention: intact  Orientation: person, place, time and situation  Memory: intact    Review of Systems:     Constitutional: Denies fatigue, night sweats  Eyes: Denies double vision, blurred vision  HENT: Denies vertigo, recent head injury  Cardiovascular: Denies chest pain, irregular heartbeats  Respiratory: Denies productive cough, shortness of breath  Gastrointestinal: Denies nausea, vomiting  Genitourinary: Denies dysuria, urinary retention  Integument: Denies hair growth change, new skin lesions  Neurologic: Denies altered mental status, seizures  Musculoskeletal: Denies joint swelling, limitation of motion  Endocrine: Denies cold intolerance, heat intolerance  Psychiatric: Admits anxiety, depression.  Denies psychosis, soo, post-traumatic stress disorder, obsessive compulsive " "disorder.   Allergic-immunologic: Denies frequent illnesses      Vital Signs:   /80   Pulse 82   Ht 172.7 cm (68\")   Wt 82.6 kg (182 lb)   BMI 27.67 kg/m²      Lab Results:   Admission on 01/07/2023, Discharged on 01/07/2023   Component Date Value Ref Range Status   • Glucose 01/07/2023 100 (H)  65 - 99 mg/dL Final   • BUN 01/07/2023 13  6 - 20 mg/dL Final   • Creatinine 01/07/2023 0.93  0.76 - 1.27 mg/dL Final   • Sodium 01/07/2023 140  136 - 145 mmol/L Final   • Potassium 01/07/2023 3.7  3.5 - 5.2 mmol/L Final   • Chloride 01/07/2023 102  98 - 107 mmol/L Final   • CO2 01/07/2023 25.9  22.0 - 29.0 mmol/L Final   • Calcium 01/07/2023 9.7  8.6 - 10.5 mg/dL Final   • Total Protein 01/07/2023 7.7  6.0 - 8.5 g/dL Final   • Albumin 01/07/2023 4.7  3.5 - 5.2 g/dL Final   • ALT (SGPT) 01/07/2023 28  1 - 41 U/L Final   • AST (SGOT) 01/07/2023 24  1 - 40 U/L Final   • Alkaline Phosphatase 01/07/2023 107  39 - 117 U/L Final   • Total Bilirubin 01/07/2023 1.0  0.0 - 1.2 mg/dL Final   • Globulin 01/07/2023 3.0  gm/dL Final   • A/G Ratio 01/07/2023 1.6  g/dL Final   • BUN/Creatinine Ratio 01/07/2023 14.0  7.0 - 25.0 Final   • Anion Gap 01/07/2023 12.1  5.0 - 15.0 mmol/L Final   • eGFR 01/07/2023 118.3  >60.0 mL/min/1.73 Final    National Kidney Foundation and American Society of Nephrology (ASN) Task Force recommended calculation based on the Chronic Kidney Disease Epidemiology Collaboration (CKD-EPI) equation refit without adjustment for race.   • Acetaminophen 01/07/2023 <5.0  0.0 - 30.0 mcg/mL Final   • Ethanol 01/07/2023 <10  0 - 10 mg/dL Final   • Ethanol % 01/07/2023 <0.010  % Final   • Salicylate 01/07/2023 <0.3  <=30.0 mg/dL Final   • Amphet/Methamphet, Screen 01/07/2023 Negative  Negative Final   • Barbiturates Screen, Urine 01/07/2023 Negative  Negative Final   • Benzodiazepine Screen, Urine 01/07/2023 Negative  Negative Final   • Cocaine Screen, Urine 01/07/2023 Negative  Negative Final   • Opiate Screen " 01/07/2023 Negative  Negative Final   • THC, Screen, Urine 01/07/2023 Negative  Negative Final   • Methadone Screen, Urine 01/07/2023 Negative  Negative Final   • Oxycodone Screen, Urine 01/07/2023 Negative  Negative Final   • Extra Tube 01/07/2023 Hold for add-ons.   Final    Auto resulted.   • Extra Tube 01/07/2023 hold for add-on   Final    Auto resulted   • Extra Tube 01/07/2023 Hold for add-ons.   Final    Auto resulted.   • Extra Tube 01/07/2023 Hold for add-ons.   Final    Auto resulted   • WBC 01/07/2023 10.58  3.40 - 10.80 10*3/mm3 Final   • RBC 01/07/2023 5.12  4.14 - 5.80 10*6/mm3 Final   • Hemoglobin 01/07/2023 13.7  13.0 - 17.7 g/dL Final   • Hematocrit 01/07/2023 39.6  37.5 - 51.0 % Final   • MCV 01/07/2023 77.3 (L)  79.0 - 97.0 fL Final   • MCH 01/07/2023 26.8  26.6 - 33.0 pg Final   • MCHC 01/07/2023 34.6  31.5 - 35.7 g/dL Final   • RDW 01/07/2023 12.8  12.3 - 15.4 % Final   • RDW-SD 01/07/2023 35.4 (L)  37.0 - 54.0 fl Final   • MPV 01/07/2023 10.5  6.0 - 12.0 fL Final   • Platelets 01/07/2023 254  140 - 450 10*3/mm3 Final   • Neutrophil % 01/07/2023 67.3  42.7 - 76.0 % Final   • Lymphocyte % 01/07/2023 23.9  19.6 - 45.3 % Final   • Monocyte % 01/07/2023 7.6  5.0 - 12.0 % Final   • Eosinophil % 01/07/2023 0.6  0.3 - 6.2 % Final   • Basophil % 01/07/2023 0.3  0.0 - 1.5 % Final   • Immature Grans % 01/07/2023 0.3  0.0 - 0.5 % Final   • Neutrophils, Absolute 01/07/2023 7.13 (H)  1.70 - 7.00 10*3/mm3 Final   • Lymphocytes, Absolute 01/07/2023 2.53  0.70 - 3.10 10*3/mm3 Final   • Monocytes, Absolute 01/07/2023 0.80  0.10 - 0.90 10*3/mm3 Final   • Eosinophils, Absolute 01/07/2023 0.06  0.00 - 0.40 10*3/mm3 Final   • Basophils, Absolute 01/07/2023 0.03  0.00 - 0.20 10*3/mm3 Final   • Immature Grans, Absolute 01/07/2023 0.03  0.00 - 0.05 10*3/mm3 Final   • nRBC 01/07/2023 0.0  0.0 - 0.2 /100 WBC Final       EKG Results:  No orders to display       Imaging Results:  No Images in the past 120 days  found..      Assessment & Plan   Diagnoses and all orders for this visit:    1. Bipolar disorder, current episode depressed, severe, with psychotic features (HCC) (Primary)  -     ARIPiprazole (ABILIFY) 5 MG tablet; Take 1 tablet by mouth Daily for 90 days.  Dispense: 30 tablet; Refill: 2    2. Generalized anxiety disorder    3. Insomnia due to mental disorder      Continue aripiprazole to target mood and psychosis. 16 minutes of supportive psychotherapy with goal to strengthen defenses, promote problems solving, restore adaptive functioning and provide symptom relief. The therapeutic alliance was strengthened to encourage the patient to express their thoughts and feelings. Esteem building was enhanced through praise, reassurance, normalizing and encouragement. Coping skills were enhanced to build distress tolerance skills and emotional regulation. Allowed patient to freely discuss issues without interruption or judgement with unconditional positive regard, active listening skills, and empathy. Provided a safe, confidential environment to facilitate the development of a positive therapeutic relationship and encourage open, honest communication. Assisted patient in identifying risk factors which would indicate the need for higher level of care including thoughts to harm self or others and/or self-harming behavior and encouraged patient to contact this office, call 911, or present to the nearest emergency room should any of these events occur. Assisted patient in processing session content; acknowledged and normalized patient's thoughts, feelings, and concerns by utilizing a person-centered approach in efforts to build appropriate rapport and a positive therapeutic relationship with open and honest communication. Patient given education on medication side effects, diagnosis/illness and relapse symptoms. Plan to continue supportive psychotherapy in next appointment to provide symptom relief. 4 weeks    Diagnoses: as  above  Symptoms: as above  Functional status: good  Mental Status Exam: as above     Treatment plan: medication management and supportive psychotherapy  Prognosis: good  Progress: continued improvement    Visit Diagnoses:    ICD-10-CM ICD-9-CM   1. Bipolar disorder, current episode depressed, severe, with psychotic features (HCC)  F31.5 296.54   2. Generalized anxiety disorder  F41.1 300.02   3. Insomnia due to mental disorder  F51.05 300.9     327.02       PLAN:  1. Safety: No acute safety concerns.   2. Therapy: Declines  3. Risk Assessment: Risk of self-harm acutely is moderate.  Risk factors include anxiety disorder, mood disorder, and recent psychosocial stressors (pandemic). Protective factors include no family history, denies access to guns/weapons, no present SI, no history of suicide attempts or self-harm in the past, minimal AODA, healthcare seeking, future orientation, willingness to engage in care.  Risk of self-harm chronically is also moderate, but could be further elevated in the event of treatment noncompliance and/or AODA.  4. Medications: Continue aripiprazole 5mg po qday to target mood. Risks, benefits, alternatives discussed with patient including increased energy, exacerbation of irritability, akathisia, GI upset, orthostatic hypotension, increased appetite, tardive dyskinesia.  After discussion of these risks and benefits, the patient voiced understanding and agreed to proceed.  5. Labs/studies: No labs/studies ordered at this time  6. Follow-up: 4 weeks    Patient screened positive for depression based on a PHQ-9 score of  on . Follow-up recommendations include: Suicide Risk Assessment performed.         TREATMENT PLAN/GOALS: Continue supportive psychotherapy efforts and medications as indicated. Treatment and medication options discussed during today's visit. Patient ackowledged and verbally consented to continue with current treatment plan and was educated on the importance of compliance  with treatment and follow-up appointments.      MEDICATION ISSUES:  ZORA reviewed as expected.  Discussed medication options and treatment plan of prescribed medication as well as the risks, benefits, and side effects including potential falls, possible impaired driving and metabolic adversities among others. Patient is agreeable to call the office with any worsening of symptoms or onset of side effects. Patient is agreeable to call 911 or go to the nearest ER should he/she begin having SI/HI. No medication side effects or related complaints today.     MEDS ORDERED DURING VISIT:  New Medications Ordered This Visit   Medications   • ARIPiprazole (ABILIFY) 5 MG tablet     Sig: Take 1 tablet by mouth Daily for 90 days.     Dispense:  30 tablet     Refill:  2       Return in about 4 weeks (around 3/16/2023) for Next scheduled follow up.         This document has been electronically signed by KAVIN Wallis  February 16, 2023 10:25 EST      Part of this note may be an electronic transcription/translation of spoken language to printed text using the Dragon Dictation System.

## 2023-03-16 ENCOUNTER — OFFICE VISIT (OUTPATIENT)
Dept: PSYCHIATRY | Facility: CLINIC | Age: 23
End: 2023-03-16
Payer: COMMERCIAL

## 2023-03-16 VITALS
SYSTOLIC BLOOD PRESSURE: 142 MMHG | BODY MASS INDEX: 27.68 KG/M2 | HEART RATE: 84 BPM | HEIGHT: 68 IN | DIASTOLIC BLOOD PRESSURE: 82 MMHG

## 2023-03-16 DIAGNOSIS — F51.05 INSOMNIA DUE TO MENTAL DISORDER: ICD-10-CM

## 2023-03-16 DIAGNOSIS — F41.1 GENERALIZED ANXIETY DISORDER: ICD-10-CM

## 2023-03-16 DIAGNOSIS — F31.5 BIPOLAR DISORDER, CURRENT EPISODE DEPRESSED, SEVERE, WITH PSYCHOTIC FEATURES: Primary | ICD-10-CM

## 2023-03-16 PROCEDURE — 1159F MED LIST DOCD IN RCRD: CPT | Performed by: NURSE PRACTITIONER

## 2023-03-16 PROCEDURE — 99214 OFFICE O/P EST MOD 30 MIN: CPT | Performed by: NURSE PRACTITIONER

## 2023-03-16 PROCEDURE — 90833 PSYTX W PT W E/M 30 MIN: CPT | Performed by: NURSE PRACTITIONER

## 2023-03-16 PROCEDURE — 1160F RVW MEDS BY RX/DR IN RCRD: CPT | Performed by: NURSE PRACTITIONER

## 2023-03-16 NOTE — PROGRESS NOTES
Subjective   Ivan Bradshaw is a 22 y.o. male who presents today for follow up.   This provider is located at [address] [place] [address], [address] in [place], [place]. In-person visit consisting of the patient and I only. The patient is accepting of and agreeable to this appointment.       Chief Complaint:  Depression, Anxiety    History of Present Illness:     1. Depression/mood: has improved  a. Work going good  2. Anxiety: has improved  a. Working on positive coping skills  3. Shellie/hypomania: denies s/sx  4. Sleep disturbance: denies  5. Low energy: denies  6. Low motivation/Interest: denies  7. Appetite change: denies  8. Medication compliant- had trouble picking up from the pharmacy  9. Side effects: denies  10. Refills needed  11. Denies SI HI AVH    Psychiatric Review of Systems: Patient denies any current or previous PTSD.     PHQ-9 Depression Screening  PHQ-9 Total Score:      Little interest or pleasure in doing things?     Feeling down, depressed, or hopeless?     Trouble falling or staying asleep, or sleeping too much?     Feeling tired or having little energy?     Poor appetite or overeating?     Feeling bad about yourself - or that you are a failure or have let yourself or your family down?     Trouble concentrating on things, such as reading the newspaper or watching television?     Moving or speaking so slowly that other people could have noticed? Or the opposite - being so fidgety or restless that you have been moving around a lot more than usual?     Thoughts that you would be better off dead, or of hurting yourself in some way?     PHQ-9 Total Score       ATUL-7         Past Surgical History:  History reviewed. No pertinent surgical history.    Problem List:  There is no problem list on file for this patient.      Allergy:   No Known Allergies     Discontinued Medications:  There are no discontinued medications.    Current Medications:   Current Outpatient Medications   Medication Sig Dispense  "Refill   • ARIPiprazole (ABILIFY) 5 MG tablet Take 1 tablet by mouth Daily for 90 days. 30 tablet 2     No current facility-administered medications for this visit.       Past Medical History:  Past Medical History:   Diagnosis Date   • Autism        Past Psychiatric History:  Began Treatment: 2017  Diagnoses: Bipolar Disorder, anxiety  Psychiatrist: Nithin  Therapist: Denies  Admission History: Denies  Medication Trials: Denies  Self Harm: Denies  Suicide Attempts: Denies    Substance Abuse History:   Types: Denies  Withdrawal Symptoms: Not applicable  Longest Period Sober: Not applicable  AA: Not applicable    Social History:  Martial Status: Single  Employed: [employer]  Kids: One   House: Self   History: Denies    Social History     Socioeconomic History   • Marital status: Single   Tobacco Use   • Smoking status: Never   • Smokeless tobacco: Never   Substance and Sexual Activity   • Alcohol use: Never   • Drug use: Never   • Sexual activity: Defer       Family History:   Suicide Attempts: Denies  Suicide Completions: Denies      History reviewed. No pertinent family history.    Developmental History:   Born: [place]  Siblings: Multiple  Childhood: Denies  High School: Dropped out 12th grade  College: Denies    Access to Firearms: Denies    Mental Status Exam:     Appearance: good eye contact, normal street clothes, groomed, sitting in chair   Behavior: pleasant and cooperative  Motor: no abnormal  Speech: normal rhythm, rate, volume, tone, not hyperverbal, not pressured, normal prosidy  Mood: \"Okay\"  Affect: euthymic  Thought Content: negative suicidal ideations, negative homicidal ideations, negative obsessions  Perceptions: negative auditory hallucinations, negative visual hallucinations, negative delusions, negative paranoia  Thought Process: goal directed, linear  Insight/Judgement: fair/fair  Cognition: grossly intact  Attention: intact  Orientation: person, place, time and situation  Memory: " "intact    Review of Systems:     Constitutional: Denies fatigue, night sweats  Eyes: Denies double vision, blurred vision  HENT: Denies vertigo, recent head injury  Cardiovascular: Denies chest pain, irregular heartbeats  Respiratory: Denies productive cough, shortness of breath  Gastrointestinal: Denies nausea, vomiting  Genitourinary: Denies dysuria, urinary retention  Integument: Denies hair growth change, new skin lesions  Neurologic: Denies altered mental status, seizures  Musculoskeletal: Denies joint swelling, limitation of motion  Endocrine: Denies cold intolerance, heat intolerance  Psychiatric: Admits anxiety, depression.  Denies psychosis, soo, post-traumatic stress disorder, obsessive compulsive disorder.   Allergic-immunologic: Denies frequent illnesses      Vital Signs:   /82   Pulse 84   Ht 172.7 cm (67.99\")   BMI 27.68 kg/m²      Lab Results:   Admission on 01/07/2023, Discharged on 01/07/2023   Component Date Value Ref Range Status   • Glucose 01/07/2023 100 (H)  65 - 99 mg/dL Final   • BUN 01/07/2023 13  6 - 20 mg/dL Final   • Creatinine 01/07/2023 0.93  0.76 - 1.27 mg/dL Final   • Sodium 01/07/2023 140  136 - 145 mmol/L Final   • Potassium 01/07/2023 3.7  3.5 - 5.2 mmol/L Final   • Chloride 01/07/2023 102  98 - 107 mmol/L Final   • CO2 01/07/2023 25.9  22.0 - 29.0 mmol/L Final   • Calcium 01/07/2023 9.7  8.6 - 10.5 mg/dL Final   • Total Protein 01/07/2023 7.7  6.0 - 8.5 g/dL Final   • Albumin 01/07/2023 4.7  3.5 - 5.2 g/dL Final   • ALT (SGPT) 01/07/2023 28  1 - 41 U/L Final   • AST (SGOT) 01/07/2023 24  1 - 40 U/L Final   • Alkaline Phosphatase 01/07/2023 107  39 - 117 U/L Final   • Total Bilirubin 01/07/2023 1.0  0.0 - 1.2 mg/dL Final   • Globulin 01/07/2023 3.0  gm/dL Final   • A/G Ratio 01/07/2023 1.6  g/dL Final   • BUN/Creatinine Ratio 01/07/2023 14.0  7.0 - 25.0 Final   • Anion Gap 01/07/2023 12.1  5.0 - 15.0 mmol/L Final   • eGFR 01/07/2023 118.3  >60.0 mL/min/1.73 Final    " National Kidney Foundation and American Society of Nephrology (ASN) Task Force recommended calculation based on the Chronic Kidney Disease Epidemiology Collaboration (CKD-EPI) equation refit without adjustment for race.   • Acetaminophen 01/07/2023 <5.0  0.0 - 30.0 mcg/mL Final   • Ethanol 01/07/2023 <10  0 - 10 mg/dL Final   • Ethanol % 01/07/2023 <0.010  % Final   • Salicylate 01/07/2023 <0.3  <=30.0 mg/dL Final   • Amphet/Methamphet, Screen 01/07/2023 Negative  Negative Final   • Barbiturates Screen, Urine 01/07/2023 Negative  Negative Final   • Benzodiazepine Screen, Urine 01/07/2023 Negative  Negative Final   • Cocaine Screen, Urine 01/07/2023 Negative  Negative Final   • Opiate Screen 01/07/2023 Negative  Negative Final   • THC, Screen, Urine 01/07/2023 Negative  Negative Final   • Methadone Screen, Urine 01/07/2023 Negative  Negative Final   • Oxycodone Screen, Urine 01/07/2023 Negative  Negative Final   • Extra Tube 01/07/2023 Hold for add-ons.   Final    Auto resulted.   • Extra Tube 01/07/2023 hold for add-on   Final    Auto resulted   • Extra Tube 01/07/2023 Hold for add-ons.   Final    Auto resulted.   • Extra Tube 01/07/2023 Hold for add-ons.   Final    Auto resulted   • WBC 01/07/2023 10.58  3.40 - 10.80 10*3/mm3 Final   • RBC 01/07/2023 5.12  4.14 - 5.80 10*6/mm3 Final   • Hemoglobin 01/07/2023 13.7  13.0 - 17.7 g/dL Final   • Hematocrit 01/07/2023 39.6  37.5 - 51.0 % Final   • MCV 01/07/2023 77.3 (L)  79.0 - 97.0 fL Final   • MCH 01/07/2023 26.8  26.6 - 33.0 pg Final   • MCHC 01/07/2023 34.6  31.5 - 35.7 g/dL Final   • RDW 01/07/2023 12.8  12.3 - 15.4 % Final   • RDW-SD 01/07/2023 35.4 (L)  37.0 - 54.0 fl Final   • MPV 01/07/2023 10.5  6.0 - 12.0 fL Final   • Platelets 01/07/2023 254  140 - 450 10*3/mm3 Final   • Neutrophil % 01/07/2023 67.3  42.7 - 76.0 % Final   • Lymphocyte % 01/07/2023 23.9  19.6 - 45.3 % Final   • Monocyte % 01/07/2023 7.6  5.0 - 12.0 % Final   • Eosinophil % 01/07/2023 0.6  0.3  - 6.2 % Final   • Basophil % 01/07/2023 0.3  0.0 - 1.5 % Final   • Immature Grans % 01/07/2023 0.3  0.0 - 0.5 % Final   • Neutrophils, Absolute 01/07/2023 7.13 (H)  1.70 - 7.00 10*3/mm3 Final   • Lymphocytes, Absolute 01/07/2023 2.53  0.70 - 3.10 10*3/mm3 Final   • Monocytes, Absolute 01/07/2023 0.80  0.10 - 0.90 10*3/mm3 Final   • Eosinophils, Absolute 01/07/2023 0.06  0.00 - 0.40 10*3/mm3 Final   • Basophils, Absolute 01/07/2023 0.03  0.00 - 0.20 10*3/mm3 Final   • Immature Grans, Absolute 01/07/2023 0.03  0.00 - 0.05 10*3/mm3 Final   • nRBC 01/07/2023 0.0  0.0 - 0.2 /100 WBC Final       EKG Results:  No orders to display       Imaging Results:  No Images in the past 120 days found..      Assessment & Plan   Diagnoses and all orders for this visit:    1. Bipolar disorder, current episode depressed, severe, with psychotic features (HCC) (Primary)    2. Generalized anxiety disorder    3. Insomnia due to mental disorder      Continue aripiprazole to target mood and psychosis. 17 minutes of supportive psychotherapy with goal to strengthen defenses, promote problems solving, restore adaptive functioning and provide symptom relief. The therapeutic alliance was strengthened to encourage the patient to express their thoughts and feelings. Esteem building was enhanced through praise, reassurance, normalizing and encouragement. Coping skills were enhanced to build distress tolerance skills and emotional regulation. Allowed patient to freely discuss issues without interruption or judgement with unconditional positive regard, active listening skills, and empathy. Provided a safe, confidential environment to facilitate the development of a positive therapeutic relationship and encourage open, honest communication. Assisted patient in identifying risk factors which would indicate the need for higher level of care including thoughts to harm self or others and/or self-harming behavior and encouraged patient to contact this  office, call 911, or present to the nearest emergency room should any of these events occur. Assisted patient in processing session content; acknowledged and normalized patient's thoughts, feelings, and concerns by utilizing a person-centered approach in efforts to build appropriate rapport and a positive therapeutic relationship with open and honest communication. Patient given education on medication side effects, diagnosis/illness and relapse symptoms. Plan to continue supportive psychotherapy in next appointment to provide symptom relief. 4 weeks    Diagnoses: as above  Symptoms: as above  Functional status: good  Mental Status Exam: as above     Treatment plan: medication management and supportive psychotherapy  Prognosis: good  Progress: continued improvement    Visit Diagnoses:    ICD-10-CM ICD-9-CM   1. Bipolar disorder, current episode depressed, severe, with psychotic features (HCC)  F31.5 296.54   2. Generalized anxiety disorder  F41.1 300.02   3. Insomnia due to mental disorder  F51.05 300.9     327.02       PLAN:  12. Safety: No acute safety concerns.   13. Therapy: Declines  14. Risk Assessment: Risk of self-harm acutely is moderate.  Risk factors include anxiety disorder, mood disorder, and recent psychosocial stressors (pandemic). Protective factors include no family history, denies access to guns/weapons, no present SI, no history of suicide attempts or self-harm in the past, minimal AODA, healthcare seeking, future orientation, willingness to engage in care.  Risk of self-harm chronically is also moderate, but could be further elevated in the event of treatment noncompliance and/or AODA.  15. Medications: Continue aripiprazole 5mg po qday to target mood. Risks, benefits, alternatives discussed with patient including increased energy, exacerbation of irritability, akathisia, GI upset, orthostatic hypotension, increased appetite, tardive dyskinesia.  After discussion of these risks and benefits, the  patient voiced understanding and agreed to proceed.  16. Labs/studies: No labs/studies ordered at this time  17. Follow-up: 4 weeks    Patient screened positive for depression based on a PHQ-9 score of  on . Follow-up recommendations include: Suicide Risk Assessment performed.         TREATMENT PLAN/GOALS: Continue supportive psychotherapy efforts and medications as indicated. Treatment and medication options discussed during today's visit. Patient ackowledged and verbally consented to continue with current treatment plan and was educated on the importance of compliance with treatment and follow-up appointments.      MEDICATION ISSUES:  ZORA reviewed as expected.  Discussed medication options and treatment plan of prescribed medication as well as the risks, benefits, and side effects including potential falls, possible impaired driving and metabolic adversities among others. Patient is agreeable to call the office with any worsening of symptoms or onset of side effects. Patient is agreeable to call 911 or go to the nearest ER should he/she begin having SI/HI. No medication side effects or related complaints today.     MEDS ORDERED DURING VISIT:  No orders of the defined types were placed in this encounter.      Return in about 4 weeks (around 4/13/2023) for Next scheduled follow up.         This document has been electronically signed by KAVIN Wallis  March 16, 2023 11:42 EDT      Part of this note may be an electronic transcription/translation of spoken language to printed text using the Dragon Dictation System.

## 2023-03-27 ENCOUNTER — TELEPHONE (OUTPATIENT)
Dept: PSYCHIATRY | Facility: CLINIC | Age: 23
End: 2023-03-27
Payer: COMMERCIAL

## 2023-03-27 NOTE — TELEPHONE ENCOUNTER
"Per provider \"Juan Carlos Patel APRN to [facility]     Yes will do at next appt\"    Called pt to relay the following information from provider. Pt did not answer, LVM to notify provider will be able to do the requested assessment at next appt and to call our office back with any questions.   "

## 2023-04-20 ENCOUNTER — OFFICE VISIT (OUTPATIENT)
Dept: PSYCHIATRY | Facility: CLINIC | Age: 23
End: 2023-04-20
Payer: COMMERCIAL

## 2023-04-20 VITALS
SYSTOLIC BLOOD PRESSURE: 148 MMHG | DIASTOLIC BLOOD PRESSURE: 84 MMHG | BODY MASS INDEX: 26.61 KG/M2 | HEIGHT: 68 IN | HEART RATE: 92 BPM | WEIGHT: 175.6 LBS

## 2023-04-20 DIAGNOSIS — F31.5 BIPOLAR DISORDER, CURRENT EPISODE DEPRESSED, SEVERE, WITH PSYCHOTIC FEATURES: Primary | ICD-10-CM

## 2023-04-20 DIAGNOSIS — F41.1 GENERALIZED ANXIETY DISORDER: ICD-10-CM

## 2023-04-20 DIAGNOSIS — F51.05 INSOMNIA DUE TO MENTAL DISORDER: ICD-10-CM

## 2023-04-20 RX ORDER — ARIPIPRAZOLE 5 MG/1
5 TABLET ORAL DAILY
Qty: 30 TABLET | Refills: 2 | Status: SHIPPED | OUTPATIENT
Start: 2023-04-20 | End: 2023-07-19

## 2023-04-20 NOTE — TREATMENT PLAN
Multi-Disciplinary Problems (from Behavioral Health Treatment Plan)    Active Problems     Problem: Anger  Start Date: 04/20/23    Problem Details: The patient self-scales this problem as a 1 with 10 being the worst.        Goal Priority Start Date Expected End Date End Date    Patient will develop specific, socially acceptable way to manage anger. -- 04/20/23 -- --    Goal Details: Progress toward goal:  Not appropriate to rate progress toward goal since this is the initial treatment plan.        Goal Intervention Frequency Start Date End Date    Process patient's angry feelings or outbursts that have recently occurred and review alternative behaviors Weekly 04/20/23 --    Intervention Details: Duration of treatment until until remission of symptoms.        Goal Intervention Frequency Start Date End Date    Work with patient to develop constructive way to handle anger. Weekly 04/20/23 --    Intervention Details: Duration of treatment until until remission of symptoms.                           I have discussed and reviewed this treatment plan with the patient.

## 2023-04-20 NOTE — PROGRESS NOTES
Subjective   Ivan Bradshaw is a 23 y.o. male who presents today for follow up.   This provider is located at [address] [place] [address], [address] in [place], [place]. In-person visit consisting of the patient and I only. The patient is accepting of and agreeable to this appointment.       Chief Complaint:  Depression, Anxiety    History of Present Illness:     1. Depression/mood: has improved  a. Things going well at work  2. Anxiety: has improved  a. Working on positive coping skills  3. Shellie/hypomania: denies s/sx  4. Anger- denies feelings of anger  a. No outbursts  b. Goes for walks and plays video games, which are positive distraction  5. Sleep disturbance: denies  6. Low energy: denies  7. Low motivation/Interest: denies  8. Appetite change: denies  9. Medication compliant  10. Side effects: denies  11. Refills needed  12. Denies SI HI AVH    Psychiatric Review of Systems: Patient denies any current or previous PTSD.     PHQ-9 Depression Screening  PHQ-9 Total Score:      Little interest or pleasure in doing things?     Feeling down, depressed, or hopeless?     Trouble falling or staying asleep, or sleeping too much?     Feeling tired or having little energy?     Poor appetite or overeating?     Feeling bad about yourself - or that you are a failure or have let yourself or your family down?     Trouble concentrating on things, such as reading the newspaper or watching television?     Moving or speaking so slowly that other people could have noticed? Or the opposite - being so fidgety or restless that you have been moving around a lot more than usual?     Thoughts that you would be better off dead, or of hurting yourself in some way?     PHQ-9 Total Score       ATUL-7         Past Surgical History:  History reviewed. No pertinent surgical history.    Problem List:  There is no problem list on file for this patient.      Allergy:   No Known Allergies     Discontinued Medications:  Medications Discontinued  "During This Encounter   Medication Reason   • ARIPiprazole (ABILIFY) 5 MG tablet Reorder       Current Medications:   Current Outpatient Medications   Medication Sig Dispense Refill   • ARIPiprazole (ABILIFY) 5 MG tablet Take 1 tablet by mouth Daily for 90 days. 30 tablet 2     No current facility-administered medications for this visit.       Past Medical History:  Past Medical History:   Diagnosis Date   • Autism        Past Psychiatric History:  Began Treatment: 2017  Diagnoses: Bipolar Disorder, anxiety  Psychiatrist: Nithin  Therapist: Denies  Admission History: Denies  Medication Trials: Denies  Self Harm: Denies  Suicide Attempts: Denies    Substance Abuse History:   Types: Denies  Withdrawal Symptoms: Not applicable  Longest Period Sober: Not applicable  AA: Not applicable    Social History:  Martial Status: Single  Employed: [employer]  Kids: One   House: Self   History: Denies    Social History     Socioeconomic History   • Marital status: Single   Tobacco Use   • Smoking status: Never   • Smokeless tobacco: Never   Substance and Sexual Activity   • Alcohol use: Never   • Drug use: Never   • Sexual activity: Defer       Family History:   Suicide Attempts: Denies  Suicide Completions: Denies      History reviewed. No pertinent family history.    Developmental History:   Born: [place]  Siblings: Multiple  Childhood: Denies  High School: Dropped out 12th grade  College: Denies    Access to Firearms: Denies    Mental Status Exam:     Appearance: good eye contact, normal street clothes, groomed, sitting in chair   Behavior: pleasant and cooperative  Motor: no abnormal  Speech: normal rhythm, rate, volume, tone, not hyperverbal, not pressured, normal prosidy  Mood: \"Okay\"  Affect: euthymic  Thought Content: negative suicidal ideations, negative homicidal ideations, negative obsessions  Perceptions: negative auditory hallucinations, negative visual hallucinations, negative delusions, negative paranoia  Thought " "Process: goal directed, linear  Insight/Judgement: fair/fair  Cognition: grossly intact  Attention: intact  Orientation: person, place, time and situation  Memory: intact    Review of Systems:     Constitutional: Denies fatigue, night sweats  Eyes: Denies double vision, blurred vision  HENT: Denies vertigo, recent head injury  Cardiovascular: Denies chest pain, irregular heartbeats  Respiratory: Denies productive cough, shortness of breath  Gastrointestinal: Denies nausea, vomiting  Genitourinary: Denies dysuria, urinary retention  Integument: Denies hair growth change, new skin lesions  Neurologic: Denies altered mental status, seizures  Musculoskeletal: Denies joint swelling, limitation of motion  Endocrine: Denies cold intolerance, heat intolerance  Psychiatric: Admits anxiety, depression.  Denies psychosis, soo, post-traumatic stress disorder, obsessive compulsive disorder.   Allergic-immunologic: Denies frequent illnesses      Vital Signs:   /84   Pulse 92   Ht 172.7 cm (67.99\")   Wt 79.7 kg (175 lb 9.6 oz)   BMI 26.71 kg/m²      Lab Results:   Admission on 01/07/2023, Discharged on 01/07/2023   Component Date Value Ref Range Status   • Glucose 01/07/2023 100 (H)  65 - 99 mg/dL Final   • BUN 01/07/2023 13  6 - 20 mg/dL Final   • Creatinine 01/07/2023 0.93  0.76 - 1.27 mg/dL Final   • Sodium 01/07/2023 140  136 - 145 mmol/L Final   • Potassium 01/07/2023 3.7  3.5 - 5.2 mmol/L Final   • Chloride 01/07/2023 102  98 - 107 mmol/L Final   • CO2 01/07/2023 25.9  22.0 - 29.0 mmol/L Final   • Calcium 01/07/2023 9.7  8.6 - 10.5 mg/dL Final   • Total Protein 01/07/2023 7.7  6.0 - 8.5 g/dL Final   • Albumin 01/07/2023 4.7  3.5 - 5.2 g/dL Final   • ALT (SGPT) 01/07/2023 28  1 - 41 U/L Final   • AST (SGOT) 01/07/2023 24  1 - 40 U/L Final   • Alkaline Phosphatase 01/07/2023 107  39 - 117 U/L Final   • Total Bilirubin 01/07/2023 1.0  0.0 - 1.2 mg/dL Final   • Globulin 01/07/2023 3.0  gm/dL Final   • A/G Ratio " 01/07/2023 1.6  g/dL Final   • BUN/Creatinine Ratio 01/07/2023 14.0  7.0 - 25.0 Final   • Anion Gap 01/07/2023 12.1  5.0 - 15.0 mmol/L Final   • eGFR 01/07/2023 118.3  >60.0 mL/min/1.73 Final    National Kidney Foundation and American Society of Nephrology (ASN) Task Force recommended calculation based on the Chronic Kidney Disease Epidemiology Collaboration (CKD-EPI) equation refit without adjustment for race.   • Acetaminophen 01/07/2023 <5.0  0.0 - 30.0 mcg/mL Final   • Ethanol 01/07/2023 <10  0 - 10 mg/dL Final   • Ethanol % 01/07/2023 <0.010  % Final   • Salicylate 01/07/2023 <0.3  <=30.0 mg/dL Final   • Amphet/Methamphet, Screen 01/07/2023 Negative  Negative Final   • Barbiturates Screen, Urine 01/07/2023 Negative  Negative Final   • Benzodiazepine Screen, Urine 01/07/2023 Negative  Negative Final   • Cocaine Screen, Urine 01/07/2023 Negative  Negative Final   • Opiate Screen 01/07/2023 Negative  Negative Final   • THC, Screen, Urine 01/07/2023 Negative  Negative Final   • Methadone Screen, Urine 01/07/2023 Negative  Negative Final   • Oxycodone Screen, Urine 01/07/2023 Negative  Negative Final   • Extra Tube 01/07/2023 Hold for add-ons.   Final    Auto resulted.   • Extra Tube 01/07/2023 hold for add-on   Final    Auto resulted   • Extra Tube 01/07/2023 Hold for add-ons.   Final    Auto resulted.   • Extra Tube 01/07/2023 Hold for add-ons.   Final    Auto resulted   • WBC 01/07/2023 10.58  3.40 - 10.80 10*3/mm3 Final   • RBC 01/07/2023 5.12  4.14 - 5.80 10*6/mm3 Final   • Hemoglobin 01/07/2023 13.7  13.0 - 17.7 g/dL Final   • Hematocrit 01/07/2023 39.6  37.5 - 51.0 % Final   • MCV 01/07/2023 77.3 (L)  79.0 - 97.0 fL Final   • MCH 01/07/2023 26.8  26.6 - 33.0 pg Final   • MCHC 01/07/2023 34.6  31.5 - 35.7 g/dL Final   • RDW 01/07/2023 12.8  12.3 - 15.4 % Final   • RDW-SD 01/07/2023 35.4 (L)  37.0 - 54.0 fl Final   • MPV 01/07/2023 10.5  6.0 - 12.0 fL Final   • Platelets 01/07/2023 254  140 - 450 10*3/mm3 Final    • Neutrophil % 01/07/2023 67.3  42.7 - 76.0 % Final   • Lymphocyte % 01/07/2023 23.9  19.6 - 45.3 % Final   • Monocyte % 01/07/2023 7.6  5.0 - 12.0 % Final   • Eosinophil % 01/07/2023 0.6  0.3 - 6.2 % Final   • Basophil % 01/07/2023 0.3  0.0 - 1.5 % Final   • Immature Grans % 01/07/2023 0.3  0.0 - 0.5 % Final   • Neutrophils, Absolute 01/07/2023 7.13 (H)  1.70 - 7.00 10*3/mm3 Final   • Lymphocytes, Absolute 01/07/2023 2.53  0.70 - 3.10 10*3/mm3 Final   • Monocytes, Absolute 01/07/2023 0.80  0.10 - 0.90 10*3/mm3 Final   • Eosinophils, Absolute 01/07/2023 0.06  0.00 - 0.40 10*3/mm3 Final   • Basophils, Absolute 01/07/2023 0.03  0.00 - 0.20 10*3/mm3 Final   • Immature Grans, Absolute 01/07/2023 0.03  0.00 - 0.05 10*3/mm3 Final   • nRBC 01/07/2023 0.0  0.0 - 0.2 /100 WBC Final       EKG Results:  No orders to display       Imaging Results:  No Images in the past 120 days found..      Assessment & Plan   Diagnoses and all orders for this visit:    1. Bipolar disorder, current episode depressed, severe, with psychotic features (Primary)  -     ARIPiprazole (ABILIFY) 5 MG tablet; Take 1 tablet by mouth Daily for 90 days.  Dispense: 30 tablet; Refill: 2    2. Generalized anxiety disorder    3. Insomnia due to mental disorder      Continue aripiprazole to target mood and psychosis. Sleep hygiene education for insomnia. 16 minutes of supportive psychotherapy with goal to strengthen defenses, promote problems solving, restore adaptive functioning and provide symptom relief. The therapeutic alliance was strengthened to encourage the patient to express their thoughts and feelings. Esteem building was enhanced through praise, reassurance, normalizing and encouragement. Coping skills were enhanced to build distress tolerance skills and emotional regulation. Allowed patient to freely discuss issues without interruption or judgement with unconditional positive regard, active listening skills, and empathy. Provided a safe,  confidential environment to facilitate the development of a positive therapeutic relationship and encourage open, honest communication. Assisted patient in identifying risk factors which would indicate the need for higher level of care including thoughts to harm self or others and/or self-harming behavior and encouraged patient to contact this office, call 911, or present to the nearest emergency room should any of these events occur. Assisted patient in processing session content; acknowledged and normalized patient's thoughts, feelings, and concerns by utilizing a person-centered approach in efforts to build appropriate rapport and a positive therapeutic relationship with open and honest communication. Patient given education on medication side effects, diagnosis/illness and relapse symptoms. Plan to continue supportive psychotherapy in next appointment to provide symptom relief. 4 weeks    Diagnoses: as above  Symptoms: as above  Functional status: good  Mental Status Exam: as above     Treatment plan: medication management and supportive psychotherapy  Prognosis: good  Progress: continued improvement    Visit Diagnoses:    ICD-10-CM ICD-9-CM   1. Bipolar disorder, current episode depressed, severe, with psychotic features  F31.5 296.54   2. Generalized anxiety disorder  F41.1 300.02   3. Insomnia due to mental disorder  F51.05 300.9     327.02       PLAN:  13. Safety: No acute safety concerns.   14. Therapy: Declines  15. Risk Assessment: Risk of self-harm acutely is moderate.  Risk factors include anxiety disorder, mood disorder, and recent psychosocial stressors (pandemic). Protective factors include no family history, denies access to guns/weapons, no present SI, no history of suicide attempts or self-harm in the past, minimal AODA, healthcare seeking, future orientation, willingness to engage in care.  Risk of self-harm chronically is also moderate, but could be further elevated in the event of treatment  noncompliance and/or AODA.  16. Medications: Continue aripiprazole 5mg po qday to target mood. Risks, benefits, alternatives discussed with patient including increased energy, exacerbation of irritability, akathisia, GI upset, orthostatic hypotension, increased appetite, tardive dyskinesia.  After discussion of these risks and benefits, the patient voiced understanding and agreed to proceed.  17. Labs/studies: No labs/studies ordered at this time  18. Follow-up: 4 weeks    Patient screened positive for depression based on a PHQ-9 score of  on . Follow-up recommendations include: Suicide Risk Assessment performed.         TREATMENT PLAN/GOALS: Continue supportive psychotherapy efforts and medications as indicated. Treatment and medication options discussed during today's visit. Patient ackowledged and verbally consented to continue with current treatment plan and was educated on the importance of compliance with treatment and follow-up appointments.      MEDICATION ISSUES:  ZORA reviewed as expected.  Discussed medication options and treatment plan of prescribed medication as well as the risks, benefits, and side effects including potential falls, possible impaired driving and metabolic adversities among others. Patient is agreeable to call the office with any worsening of symptoms or onset of side effects. Patient is agreeable to call 911 or go to the nearest ER should he/she begin having SI/HI. No medication side effects or related complaints today.     MEDS ORDERED DURING VISIT:  New Medications Ordered This Visit   Medications   • ARIPiprazole (ABILIFY) 5 MG tablet     Sig: Take 1 tablet by mouth Daily for 90 days.     Dispense:  30 tablet     Refill:  2       Return in about 4 weeks (around 5/18/2023) for Next scheduled follow up.         This document has been electronically signed by KAVIN Wallis  April 20, 2023 11:15 EDT      Part of this note may be an electronic transcription/translation of spoken  language to printed text using the Dragon Dictation System.

## 2023-07-17 VITALS
DIASTOLIC BLOOD PRESSURE: 91 MMHG | RESPIRATION RATE: 18 BRPM | OXYGEN SATURATION: 100 % | WEIGHT: 169.53 LBS | HEIGHT: 68 IN | TEMPERATURE: 98.7 F | SYSTOLIC BLOOD PRESSURE: 146 MMHG | HEART RATE: 78 BPM | BODY MASS INDEX: 25.69 KG/M2

## 2023-07-17 LAB
FLUAV AG NPH QL: NEGATIVE
FLUBV AG NPH QL IA: NEGATIVE
S PYO AG THROAT QL: NEGATIVE

## 2023-07-17 PROCEDURE — 87880 STREP A ASSAY W/OPTIC: CPT | Performed by: EMERGENCY MEDICINE

## 2023-07-17 PROCEDURE — 87081 CULTURE SCREEN ONLY: CPT | Performed by: EMERGENCY MEDICINE

## 2023-07-17 PROCEDURE — 99283 EMERGENCY DEPT VISIT LOW MDM: CPT

## 2023-07-17 PROCEDURE — 87635 SARS-COV-2 COVID-19 AMP PRB: CPT | Performed by: EMERGENCY MEDICINE

## 2023-07-17 PROCEDURE — 87804 INFLUENZA ASSAY W/OPTIC: CPT | Performed by: EMERGENCY MEDICINE

## 2023-07-18 ENCOUNTER — HOSPITAL ENCOUNTER (EMERGENCY)
Facility: HOSPITAL | Age: 23
Discharge: HOME | End: 2023-07-18
Attending: EMERGENCY MEDICINE | Admitting: EMERGENCY MEDICINE
Payer: COMMERCIAL

## 2023-07-18 DIAGNOSIS — J06.9 UPPER RESPIRATORY TRACT INFECTION, UNSPECIFIED TYPE: Primary | ICD-10-CM

## 2023-07-18 LAB — SARS-COV-2 RNA RESP QL NAA+PROBE: NOT DETECTED

## 2023-07-18 NOTE — DISCHARGE INSTRUCTIONS
Take Tylenol Motrin as needed for fever and pain.  Do salt water gargles to help with your sore throat and inflammation.  Follow-up with PCP 3 to 5 days.  Return to the emergency room for new or worsening symptoms.

## 2023-07-18 NOTE — Clinical Note
[facility] EMERGENCY ROOM  [address]  [place] [address]  Phone: [phone]    Ivan Bradshaw was seen and treated in our emergency department on 7/17/2023.  He may return to work on 07/19/2023.         Thank you for choosing [facility].    Shabbir, Yusra, PA

## 2023-07-18 NOTE — ED PROVIDER NOTES
"Time: 10:44 PM EDT  Date of encounter:  7/17/2023  Independent Historian/Clinical History and Information was obtained by:   Patient    History is limited by: N/A    Chief Complaint: Sore throat      History of Present Illness:  Patient is a 23 y.o. year old male who presents to the emergency department for evaluation of runny nose and sore throat for the past couple of days.  Patient admits to mild nonproductive cough.  Patient denies fever chills, nausea, vomiting, diarrhea, abdominal pain, chest pain, shortness of breath.  Patient states that his child has RSV and child's mother has COVID.    HPI    Patient Care Team  Primary Care Provider: Provider, No Known    Past Medical History:     No Known Allergies  Past Medical History:   Diagnosis Date    Autism      No past surgical history on file.  No family history on file.    Home Medications:  Prior to Admission medications    Medication Sig Start Date End Date Taking? Authorizing Provider   ARIPiprazole (Abilify) 5 MG tablet Take 1 tablet by mouth Daily. 7/17/23   AmandaJuan Carlos APRN   ibuprofen (ADVIL,MOTRIN) 400 MG tablet  5/10/23   Provider, MD Ana        Social History:   Social History     Tobacco Use    Smoking status: Never    Smokeless tobacco: Never   Substance Use Topics    Alcohol use: Never    Drug use: Never         Review of Systems:  Review of Systems   Constitutional:  Negative for chills and fever.   HENT:  Positive for rhinorrhea and sore throat.    Respiratory:  Positive for cough. Negative for shortness of breath.    Cardiovascular:  Negative for chest pain.   Gastrointestinal:  Negative for abdominal pain, nausea and vomiting.      Physical Exam:  /91 (BP Location: Right arm, Patient Position: Sitting)   Pulse 78   Temp 98.7 °F (37.1 °C) (Oral)   Resp 18   Ht 172.7 cm (68\")   Wt 76.9 kg (169 lb 8.5 oz)   SpO2 100%   BMI 25.78 kg/m²     Physical Exam  Vitals and nursing note reviewed.   Constitutional:       General: He is " not in acute distress.     Appearance: Normal appearance. He is not toxic-appearing.   HENT:      Head: Normocephalic and atraumatic.      Right Ear: Tympanic membrane normal.      Left Ear: Tympanic membrane normal.      Nose: Nose normal.      Mouth/Throat:      Mouth: Mucous membranes are moist.      Pharynx: No oropharyngeal exudate or posterior oropharyngeal erythema.      Tonsils: No tonsillar exudate or tonsillar abscesses. 0 on the right. 0 on the left.   Eyes:      General:         Right eye: No discharge.         Left eye: No discharge.      Extraocular Movements: Extraocular movements intact.      Pupils: Pupils are equal, round, and reactive to light.   Cardiovascular:      Rate and Rhythm: Normal rate and regular rhythm.   Pulmonary:      Effort: Pulmonary effort is normal. No respiratory distress.      Breath sounds: Normal breath sounds. No wheezing.   Chest:      Chest wall: No tenderness.   Abdominal:      General: Abdomen is flat. Bowel sounds are normal. There is no distension.      Palpations: Abdomen is soft.      Tenderness: There is no abdominal tenderness. There is no right CVA tenderness or guarding.   Musculoskeletal:         General: Normal range of motion.      Cervical back: Normal range of motion and neck supple.   Lymphadenopathy:      Cervical: No cervical adenopathy.   Skin:     General: Skin is warm and dry.      Coloration: Skin is not jaundiced or pale.   Neurological:      General: No focal deficit present.      Mental Status: He is alert and oriented to person, place, and time.      Motor: No weakness.   Psychiatric:         Mood and Affect: Mood normal.         Behavior: Behavior normal.      .          Procedures:  Procedures      Medical Decision Making:      Comorbidities that affect care:    None    External Notes reviewed:    Previous Clinic Note: Patient seen at urgent care on 7/13/2023      The following orders were placed and all results were independently analyzed by  me:  Orders Placed This Encounter   Procedures    COVID-19,CEPHEID/CORINE,COR/ANGEL/PAD/YOSEF/MAD IN-HOUSE(OR EMERGENT/ADD-ON),NP SWAB IN TRANSPORT MEDIA 3-4 HR TAT, RT-PCR - Swab, Nasopharynx    Influenza Antigen, Rapid - Swab, Nasopharynx    Rapid Strep A Screen - Swab, Throat    Beta Strep Culture, Throat - Swab, Throat       Medications Given in the Emergency Department:  Medications - No data to display     ED Course:         Labs:    Lab Results (last 24 hours)       Procedure Component Value Units Date/Time    COVID-19,CEPHEID/CORINE,COR/ANGEL/PAD/YOSEF/MAD IN-HOUSE(OR EMERGENT/ADD-ON),NP SWAB IN TRANSPORT MEDIA 3-4 HR TAT, RT-PCR - Swab, Nasopharynx [[id number]]  (Normal) Collected: 07/17/23 2256    Specimen: Swab from Nasopharynx Updated: 07/18/23 0054     COVID19 Not Detected    Narrative:      Fact sheet for providers: https://www.fda.gov/media/118581/download     Fact sheet for patients: https://www.fda.gov/media/593019/download  Fact sheet for providers: https://www.fda.gov/media/456335/download     Fact sheet for patients: https://www.fda.gov/media/353007/download    Influenza Antigen, Rapid - Swab, Nasopharynx [[id number]]  (Normal) Collected: 07/17/23 2256    Specimen: Swab from Nasopharynx Updated: 07/17/23 2356     Influenza A Ag, EIA Negative     Influenza B Ag, EIA Negative    Rapid Strep A Screen - Swab, Throat [[id number]]  (Normal) Collected: 07/17/23 2256    Specimen: Swab from Throat Updated: 07/17/23 2342     Strep A Ag Negative    Beta Strep Culture, Throat - Swab, Throat [[id number]] Collected: 07/17/23 2256    Specimen: Swab from Throat Updated: 07/17/23 2342             Imaging:    No Radiology Exams Resulted Within Past 24 Hours      Differential Diagnosis and Discussion:    Sore Throat: Differential diagnosis includes but is not limited to bacterial infection, viral infection, inhaled irritants, sinus drainage, thyroiditis, epiglottitis, and retropharyngeal abscess.    All labs were reviewed and  interpreted by me.    MDM  Number of Diagnoses or Management Options  Upper respiratory tract infection, unspecified type  Diagnosis management comments: Patient reports emergency department complaining of sore throat and runny nose.  Patient states that his child has RSV and child's mother has COVID.  On physical exam patient is no acute distress or ill-appearing.  Patient has no chest or abdominal tenderness.  Patient's oropharynx not erythematous with no exudates noted.  Patient's tonsils 0 bilaterally.  Patient has no cervical adenopathy.  Patient is afebrile.  Patient's COVID, flu, strep all negative.  Patient's symptoms most consistent with allergies or upper respiratory infection of other sort.  Patient told to take Tylenol ibuprofen as needed for fever and pain.  Patient told to follow-up with PCP in 3 to 5 days patient given return precautions.  Patient states understanding agreeable to treatment plan.             Patient Care Considerations:    LABS: I considered ordering labs, however not indicated at this time.      Consultants/Shared Management Plan:    None    Social Determinants of Health:    Patient is independent, reliable, and has access to care.       Disposition and Care Coordination:    Discharged: The patient is suitable and stable for discharge with no need for consideration of observation or admission.    I have explained the patient´s condition, diagnoses and treatment plan based on the information available to me at this time. I have answered questions and addressed any concerns. The patient has a good  understanding of the patient´s diagnosis, condition, and treatment plan as can be expected at this point. The vital signs have been stable. The patient´s condition is stable and appropriate for discharge from the emergency department.      The patient will pursue further outpatient evaluation with the primary care physician or other designated or consulting physician as outlined in the  discharge instructions. They are agreeable to this plan of care and follow-up instructions have been explained in detail. The patient has received these instructions in written format and have expressed an understanding of the discharge instructions. The patient is aware that any significant change in condition or worsening of symptoms should prompt an immediate return to this or the closest emergency department or call to 911.  I have explained discharge medications and the need for follow up with the patient/caretakers. This was also printed in the discharge instructions. Patient was discharged with the following medications and follow up:      Medication List      No changes were made to your prescriptions during this visit.      Provider, No Known  [facility]  [place] [address]    In 3 days         Final diagnoses:   Upper respiratory tract infection, unspecified type        ED Disposition       ED Disposition   Discharge    Condition   Stable    Comment   --               This medical record created using voice recognition software.             Shabbir, Yusra, PA  07/18/23 0107

## 2023-07-20 LAB — BACTERIA SPEC AEROBE CULT: NORMAL
